# Patient Record
Sex: MALE | Race: WHITE | NOT HISPANIC OR LATINO | Employment: FULL TIME | URBAN - METROPOLITAN AREA
[De-identification: names, ages, dates, MRNs, and addresses within clinical notes are randomized per-mention and may not be internally consistent; named-entity substitution may affect disease eponyms.]

---

## 2022-03-23 ENCOUNTER — TRANSCRIBE ORDERS (OUTPATIENT)
Dept: DIABETES SERVICES | Facility: HOSPITAL | Age: 37
End: 2022-03-23

## 2022-03-23 DIAGNOSIS — E11.9 TYPE 2 DIABETES MELLITUS WITHOUT COMPLICATION, UNSPECIFIED WHETHER LONG TERM INSULIN USE: Primary | ICD-10-CM

## 2022-04-07 ENCOUNTER — TRANSCRIBE ORDERS (OUTPATIENT)
Dept: DIABETES SERVICES | Facility: HOSPITAL | Age: 37
End: 2022-04-07

## 2022-04-07 DIAGNOSIS — E11.9 TYPE 2 DIABETES MELLITUS WITHOUT COMPLICATION, UNSPECIFIED WHETHER LONG TERM INSULIN USE: Primary | ICD-10-CM

## 2022-05-10 ENCOUNTER — HOSPITAL ENCOUNTER (OUTPATIENT)
Dept: DIABETES SERVICES | Facility: HOSPITAL | Age: 37
Setting detail: RECURRING SERIES
Discharge: HOME | End: 2022-05-10

## 2022-05-10 PROCEDURE — G0108 DIAB MANAGE TRN  PER INDIV: HCPCS

## 2022-05-10 NOTE — CONSULTS
[facility]    Patient Name:  Montrell Raymundo  YOB: 1985  MRN: [id number]  Admit Date:  5/10/2022    DIABETES EDUCATION CONSULT, 90 MINUTES. This medical referred consult was provided as an in-office visit. Consent for treatment was given verbally.Patient attended their comprehensive diabetes class with RN and RD.  Please see media tab for assessment and notes if you use [facility]. If you are not an [facility] user a copy of patient's assessment and notes will be sent per routine. Thank you for this referral.        Electronically signed by:  Lisha Sierra RD  05/10/22 16:41 EDT

## 2022-05-31 ENCOUNTER — LAB (OUTPATIENT)
Dept: LAB | Facility: HOSPITAL | Age: 37
End: 2022-05-31

## 2022-05-31 ENCOUNTER — OFFICE VISIT (OUTPATIENT)
Dept: ENDOCRINOLOGY | Facility: CLINIC | Age: 37
End: 2022-05-31

## 2022-05-31 VITALS
SYSTOLIC BLOOD PRESSURE: 112 MMHG | OXYGEN SATURATION: 97 % | HEIGHT: 72 IN | HEART RATE: 54 BPM | WEIGHT: 179 LBS | BODY MASS INDEX: 24.24 KG/M2 | DIASTOLIC BLOOD PRESSURE: 60 MMHG

## 2022-05-31 DIAGNOSIS — I10 BENIGN HYPERTENSION: ICD-10-CM

## 2022-05-31 DIAGNOSIS — E11.65 TYPE 2 DIABETES MELLITUS WITH HYPERGLYCEMIA, WITHOUT LONG-TERM CURRENT USE OF INSULIN: Primary | ICD-10-CM

## 2022-05-31 DIAGNOSIS — E11.65 TYPE 2 DIABETES MELLITUS WITH HYPERGLYCEMIA, WITHOUT LONG-TERM CURRENT USE OF INSULIN: ICD-10-CM

## 2022-05-31 LAB
ALBUMIN SERPL-MCNC: 4.8 G/DL (ref 3.5–5.2)
ALBUMIN UR-MCNC: <1.2 MG/DL
ALBUMIN/GLOB SERPL: 2.2 G/DL
ALP SERPL-CCNC: 74 U/L (ref 39–117)
ALT SERPL W P-5'-P-CCNC: 19 U/L (ref 1–41)
ANION GAP SERPL CALCULATED.3IONS-SCNC: 13.5 MMOL/L (ref 5–15)
AST SERPL-CCNC: 15 U/L (ref 1–40)
BILIRUB SERPL-MCNC: 0.3 MG/DL (ref 0–1.2)
BUN SERPL-MCNC: 14 MG/DL (ref 6–20)
BUN/CREAT SERPL: 15.9 (ref 7–25)
CALCIUM SPEC-SCNC: 9.6 MG/DL (ref 8.6–10.5)
CHLORIDE SERPL-SCNC: 104 MMOL/L (ref 98–107)
CO2 SERPL-SCNC: 22.5 MMOL/L (ref 22–29)
CREAT SERPL-MCNC: 0.88 MG/DL (ref 0.76–1.27)
CREAT UR-MCNC: 143.8 MG/DL
EGFRCR SERPLBLD CKD-EPI 2021: 115.7 ML/MIN/1.73
EXPIRATION DATE: ABNORMAL
EXPIRATION DATE: NORMAL
GLOBULIN UR ELPH-MCNC: 2.2 GM/DL
GLUCOSE BLDC GLUCOMTR-MCNC: 168 MG/DL (ref 70–130)
GLUCOSE SERPL-MCNC: 87 MG/DL (ref 65–99)
HBA1C MFR BLD: 5.5 %
Lab: ABNORMAL
Lab: NORMAL
MICROALBUMIN/CREAT UR: NORMAL MG/G{CREAT}
POTASSIUM SERPL-SCNC: 4 MMOL/L (ref 3.5–5.2)
PROT SERPL-MCNC: 7 G/DL (ref 6–8.5)
SODIUM SERPL-SCNC: 140 MMOL/L (ref 136–145)
TSH SERPL DL<=0.05 MIU/L-ACNC: 0.93 UIU/ML (ref 0.27–4.2)

## 2022-05-31 PROCEDURE — 99204 OFFICE O/P NEW MOD 45 MIN: CPT | Performed by: INTERNAL MEDICINE

## 2022-05-31 PROCEDURE — 82947 ASSAY GLUCOSE BLOOD QUANT: CPT | Performed by: INTERNAL MEDICINE

## 2022-05-31 PROCEDURE — 80053 COMPREHEN METABOLIC PANEL: CPT

## 2022-05-31 PROCEDURE — 86341 ISLET CELL ANTIBODY: CPT

## 2022-05-31 PROCEDURE — 82570 ASSAY OF URINE CREATININE: CPT

## 2022-05-31 PROCEDURE — 84443 ASSAY THYROID STIM HORMONE: CPT

## 2022-05-31 PROCEDURE — 36415 COLL VENOUS BLD VENIPUNCTURE: CPT

## 2022-05-31 PROCEDURE — 82043 UR ALBUMIN QUANTITATIVE: CPT

## 2022-05-31 PROCEDURE — 83036 HEMOGLOBIN GLYCOSYLATED A1C: CPT | Performed by: INTERNAL MEDICINE

## 2022-05-31 RX ORDER — LOSARTAN POTASSIUM 25 MG/1
25 TABLET ORAL DAILY
COMMUNITY
Start: 2022-05-28

## 2022-05-31 RX ORDER — BLOOD SUGAR DIAGNOSTIC
1 STRIP MISCELLANEOUS 2 TIMES DAILY
Qty: 200 EACH | Refills: 3 | Status: SHIPPED | OUTPATIENT
Start: 2022-05-31 | End: 2022-10-17 | Stop reason: SDUPTHER

## 2022-05-31 RX ORDER — LANCETS 33 GAUGE
EACH MISCELLANEOUS 2 TIMES DAILY
COMMUNITY
Start: 2022-05-06 | End: 2022-10-17 | Stop reason: SDUPTHER

## 2022-05-31 RX ORDER — BLOOD SUGAR DIAGNOSTIC
STRIP MISCELLANEOUS 2 TIMES DAILY
COMMUNITY
Start: 2022-05-06 | End: 2022-05-31 | Stop reason: SDUPTHER

## 2022-05-31 RX ORDER — ROSUVASTATIN CALCIUM 20 MG/1
TABLET, COATED ORAL
COMMUNITY
Start: 2022-05-28 | End: 2023-02-03 | Stop reason: SDUPTHER

## 2022-05-31 RX ORDER — ATORVASTATIN CALCIUM 10 MG/1
10 TABLET, FILM COATED ORAL NIGHTLY
COMMUNITY
Start: 2022-03-03 | End: 2022-05-31 | Stop reason: ALTCHOICE

## 2022-05-31 RX ORDER — BLOOD-GLUCOSE METER
KIT MISCELLANEOUS SEE ADMIN INSTRUCTIONS
COMMUNITY
Start: 2022-03-04

## 2022-05-31 NOTE — PROGRESS NOTES
"     Office Note      Date: 2022  Patient Name: Montrell Raymundo  MRN: [id number]  : 1985    Chief Complaint   Patient presents with   • Diabetes     Type I       History of Present Illness:   Montrell Raymundo is a 36 y.o. male who presents for Diabetes type 2. Diagnosed in: . Treated in past with oral agents. Current treatments: metformin. Number of insulin shots per day: none. Checks blood sugar 2 times a day. Has low blood sugar: no. Aspirin use: No -  . Statin use: Yes. ACE-I/ARB use: Yes.  Last eye exam: prior to diagnosis.    He had COVID 2022.  His BP was high and he saw a cardiologist.  Labs were done and he was diagnosed with DM.  He had DM education earlier this month.  He has noted some depression with the diagnosis.  He notes increased appetite.  He hasn't been eating much.  He has lost about 25 pounds - some of this was intentional.      Subjective      Diabetic Complications:  Eyes: No  Kidneys: No  Feet: No  Heart: No    Diet and Exercise:  Meals per day: 3  Minutes of exercise per week: 210 mins.    Review of Systems:   Review of Systems   Constitutional: Positive for activity change.   HENT: Negative.    Eyes: Negative.    Respiratory: Negative.    Cardiovascular: Negative.    Gastrointestinal: Positive for constipation.   Endocrine: Negative.    Genitourinary: Negative.    Musculoskeletal: Negative.    Skin: Negative.    Allergic/Immunologic: Negative.    Neurological: Negative.    Hematological: Negative.    Psychiatric/Behavioral: Negative.        The following portions of the patient's history were reviewed and updated as appropriate: allergies, current medications, past family history, past medical history, past social history, past surgical history and problem list.    Objective     Visit Vitals  /60 (BP Location: Left arm, Patient Position: Sitting, Cuff Size: Adult)   Pulse 54   Ht 182.9 cm (72\")   Wt 81.2 kg (179 lb)   SpO2 97%   BMI 24.28 kg/m²       Physical " Exam:  Physical Exam  Constitutional:       Appearance: Normal appearance.   HENT:      Head: Normocephalic and atraumatic.   Eyes:      Extraocular Movements: Extraocular movements intact.      Conjunctiva/sclera: Conjunctivae normal.      Pupils: Pupils are equal, round, and reactive to light.   Neck:      Thyroid: No thyroid mass, thyromegaly or thyroid tenderness.   Cardiovascular:      Rate and Rhythm: Normal rate and regular rhythm.      Pulses: Normal pulses.           Dorsalis pedis pulses are 2+ on the right side and 2+ on the left side.        Posterior tibial pulses are 2+ on the right side and 2+ on the left side.      Heart sounds: Normal heart sounds.   Pulmonary:      Effort: Pulmonary effort is normal.      Breath sounds: Normal breath sounds.   Abdominal:      General: Bowel sounds are normal.      Palpations: Abdomen is soft.   Musculoskeletal:         General: Normal range of motion.      Cervical back: Normal range of motion.   Feet:      Right foot:      Protective Sensation: 5 sites tested. 5 sites sensed.      Skin integrity: Skin integrity normal.      Toenail Condition: Right toenails are normal.      Left foot:      Protective Sensation: 5 sites tested. 5 sites sensed.      Skin integrity: Skin integrity normal.      Toenail Condition: Left toenails are normal.   Lymphadenopathy:      Cervical: No cervical adenopathy.   Skin:     General: Skin is warm and dry.   Neurological:      General: No focal deficit present.      Mental Status: He is alert.   Psychiatric:         Mood and Affect: Mood normal.         Behavior: Behavior normal.         Thought Content: Thought content normal.         Judgment: Judgment normal.         Labs:    HbA1c  Hemoglobin A1C   Date Value Ref Range Status   05/31/2022 5.5 % Final   .    CMP  No results found for: GLUCOSE, BUN, CREATININE, EGFRIFNONA, EGFRIFAFRI, BCR, K, CO2, CALCIUM, PROTENTOTREF, LABIL2, BILIRUBIN, AST, ALT     Lipid Panel        TSH  No  results found for: TSH, FREET4     Hemoglobin A1C  Lab Results   Component Value Date    HGBA1C 5.5 05/31/2022        Microalbumin/Creatinine  No results found for: MALBCRERATI        Assessment / Plan      Assessment & Plan:  Diagnoses and all orders for this visit:    1. Type 2 diabetes mellitus with hyperglycemia, without long-term current use of insulin (HCC) (Primary)  Assessment & Plan:  Diabetes is improving with treatment.  Recent FSBS have been good.  A1c normal at 5.5%.  We discussed whether he still needs metformin.  Diabetes will be reassessed in 3 months.    He developed DM after COVID-19.  Recent FSBS have been okay.  Not sure if he still has DM.  We discussed stopping metformin.  Also would like to do labs to rule out type I DM (in honeymoon phase).      He has struggled with some depression with the DM diagnosis.  He feels this should improve over time.  We discussed counseling or seeing his PCP about meds.  He declined.    Orders:  -     POC Glucose, Blood  -     POC Glycosylated Hemoglobin (Hb A1C)  -     Microalbumin / Creatinine Urine Ratio - Urine, Clean Catch; Future  -     Comprehensive Metabolic Panel; Future  -     TSH; Future  -     Glutamic Acid Decarboxylase; Future    2. Benign hypertension  Assessment & Plan:  Hypertension is improving with treatment.  Continue current treatment regimen.  Blood pressure will be reassessed at the next regular appointment.      Other orders  -     OneTouch Verio test strip; 1 each by Other route 2 (Two) Times a Day. ICD-10 E11.65  Dispense: 200 each; Refill: 3       Return in about 3 months (around 8/31/2022) for Recheck with A1c.    Tommy Emanuel MD   05/31/2022

## 2022-05-31 NOTE — ASSESSMENT & PLAN NOTE
Diabetes is improving with treatment.  Recent FSBS have been good.  A1c normal at 5.5%.  We discussed whether he still needs metformin.  Diabetes will be reassessed in 3 months.    He developed DM after COVID-19.  Recent FSBS have been okay.  Not sure if he still has DM.  We discussed stopping metformin.  Also would like to do labs to rule out type I DM (in honeymoon phase).      He has struggled with some depression with the DM diagnosis.  He feels this should improve over time.  We discussed counseling or seeing his PCP about meds.  He declined.

## 2022-06-01 LAB — HBA1C MFR BLD: 10.5 %

## 2022-06-02 ENCOUNTER — TELEPHONE (OUTPATIENT)
Dept: ENDOCRINOLOGY | Facility: CLINIC | Age: 37
End: 2022-06-02

## 2022-06-02 ENCOUNTER — PATIENT ROUNDING (BHMG ONLY) (OUTPATIENT)
Dept: ENDOCRINOLOGY | Facility: CLINIC | Age: 37
End: 2022-06-02

## 2022-06-02 LAB — GAD65 AB SER IA-ACNC: <5 U/ML (ref 0–5)

## 2022-06-02 NOTE — TELEPHONE ENCOUNTER
Spoke with patient.  Reviewed lab letter with him from 06/01/2022.  He wanted to know if he was type 1 or type 2 diabetic.  Advised there was still one result pending.  Wanted to know what he would look for in that result to distinguish between the two.

## 2022-06-02 NOTE — PROGRESS NOTES
A [employer] message has been sent to the patient for patient rounding with [facility]

## 2022-06-09 ENCOUNTER — HOSPITAL ENCOUNTER (OUTPATIENT)
Dept: DIABETES SERVICES | Facility: HOSPITAL | Age: 37
Setting detail: RECURRING SERIES
Discharge: HOME | End: 2022-06-09

## 2022-10-17 ENCOUNTER — OFFICE VISIT (OUTPATIENT)
Dept: ENDOCRINOLOGY | Facility: CLINIC | Age: 37
End: 2022-10-17

## 2022-10-17 VITALS
BODY MASS INDEX: 22.75 KG/M2 | SYSTOLIC BLOOD PRESSURE: 102 MMHG | OXYGEN SATURATION: 98 % | WEIGHT: 168 LBS | DIASTOLIC BLOOD PRESSURE: 62 MMHG | HEIGHT: 72 IN | HEART RATE: 74 BPM

## 2022-10-17 DIAGNOSIS — I10 BENIGN HYPERTENSION: ICD-10-CM

## 2022-10-17 DIAGNOSIS — E11.65 TYPE 2 DIABETES MELLITUS WITH HYPERGLYCEMIA, WITHOUT LONG-TERM CURRENT USE OF INSULIN: Primary | ICD-10-CM

## 2022-10-17 LAB
EXPIRATION DATE: NORMAL
EXPIRATION DATE: NORMAL
GLUCOSE BLDC GLUCOMTR-MCNC: 71 MG/DL (ref 70–130)
HBA1C MFR BLD: 5.4 %
Lab: NORMAL
Lab: NORMAL

## 2022-10-17 PROCEDURE — 99213 OFFICE O/P EST LOW 20 MIN: CPT | Performed by: INTERNAL MEDICINE

## 2022-10-17 PROCEDURE — 83036 HEMOGLOBIN GLYCOSYLATED A1C: CPT | Performed by: INTERNAL MEDICINE

## 2022-10-17 PROCEDURE — 82947 ASSAY GLUCOSE BLOOD QUANT: CPT | Performed by: INTERNAL MEDICINE

## 2022-10-17 RX ORDER — LANCETS 33 GAUGE
1 EACH MISCELLANEOUS 2 TIMES DAILY
Qty: 200 EACH | Refills: 3 | Status: SHIPPED | OUTPATIENT
Start: 2022-10-17

## 2022-10-17 RX ORDER — BLOOD SUGAR DIAGNOSTIC
1 STRIP MISCELLANEOUS 2 TIMES DAILY
Qty: 200 EACH | Refills: 3 | Status: SHIPPED | OUTPATIENT
Start: 2022-10-17

## 2022-10-17 NOTE — ASSESSMENT & PLAN NOTE
Diabetes is improving with lifestyle modifications.   He doesn't appear to have DM anymore.  Diabetes will be reassessed in 6 months.

## 2022-10-17 NOTE — PROGRESS NOTES
"     Office Note      Date: 10/17/2022  Patient Name: Montrell Raymundo  MRN: [id number]  : 1985    Chief Complaint   Patient presents with   • Diabetes       History of Present Illness:   Montrell Raymundo is a 36 y.o. male who presents for Diabetes type 2. Diagnosed in: . Treated in past with oral agents. Current treatments: diet. Number of insulin shots per day: none. Checks blood sugar 1 time a day. Has low blood sugar: no. Aspirin use: No -  . Statin use: Yes. ACE-I/ARB use: Yes.  Change in health since last visit: none.  Last eye exam: 2022.    Subjective      Diabetic Complications:  Eyes: No  Kidneys: No  Feet: No  Heart: No    Diet and Exercise:  Meals per day: 3  Minutes of exercise per week: 210 mins.    Review of Systems:   Review of Systems   Constitutional: Negative.    Cardiovascular: Negative.    Gastrointestinal: Negative.    Endocrine: Negative.        The following portions of the patient's history were reviewed and updated as appropriate: allergies, current medications, past family history, past medical history, past social history, past surgical history and problem list.    Objective       Visit Vitals  /62   Pulse 74   Ht 182.9 cm (72\")   Wt 76.2 kg (168 lb)   SpO2 98%   BMI 22.78 kg/m²       Physical Exam:  Physical Exam  Constitutional:       Appearance: Normal appearance.   Neurological:      Mental Status: He is alert.         Labs:    HbA1c  Lab Results   Component Value Date    HGBA1C 5.4 10/17/2022       CMP  Lab Results   Component Value Date    GLUCOSE 87 2022    BUN 14 2022    CREATININE 0.88 2022    BCR 15.9 2022    K 4.0 2022    CO2 22.5 2022    CALCIUM 9.6 2022    AST 15 2022    ALT 19 2022        Lipid Panel        TSH  Lab Results   Component Value Date    TSH 0.930 2022        Hemoglobin A1C  Lab Results   Component Value Date    HGBA1C 5.4 10/17/2022        Microalbumin/Creatinine  Lab Results   Component " Value Date    FACUNDO  05/31/2022      Comment:      Unable to calculate    MICROALBUR <1.2 05/31/2022           Assessment / Plan      Assessment & Plan:  Diagnoses and all orders for this visit:    1. Type 2 diabetes mellitus with hyperglycemia, without long-term current use of insulin (HCC) (Primary)  Assessment & Plan:  Diabetes is improving with lifestyle modifications.   He doesn't appear to have DM anymore.  Diabetes will be reassessed in 6 months.    Orders:  -     POC Glucose, Blood  -     POC Glycosylated Hemoglobin (Hb A1C)    2. Benign hypertension  Assessment & Plan:  Hypertension is unchanged.  Continue current treatment regimen.  Blood pressure will be reassessed at the next regular appointment.      Other orders  -     OneTouch Verio test strip; 1 each by Other route 2 (Two) Times a Day. ICD-10 E11.65  Dispense: 200 each; Refill: 3  -     Lancets (OneTouch Delica Plus [id number]) misc; 1 each 2 (Two) Times a Day. ICD-10 E11.9  Dispense: 200 each; Refill: 3      Return in about 6 months (around 4/17/2023) for Recheck with A1c.    Tommy Emanuel MD   10/17/2022

## 2023-01-23 ENCOUNTER — EDUCATION (OUTPATIENT)
Dept: DIABETES SERVICES | Facility: HOSPITAL | Age: 38
End: 2023-01-23
Payer: COMMERCIAL

## 2023-01-23 NOTE — CONSULTS
Diabetes Education    Patient Name:  Montrell Raymundo  YOB: 1985  MRN: [id number]  Admit Date:  (Not on file)      Patient participated in 15 min 6 mos diabetes education follow-up via telephone today. Please see media tab for assessment and notes if you use [facility]. If you are not an [employer] user a copy of patient's assessment and notes will be sent per routine. Thank you.       Electronically signed by:  Nena Blake RN  01/23/23 10:52 EST

## 2023-02-06 RX ORDER — ROSUVASTATIN CALCIUM 20 MG/1
20 TABLET, COATED ORAL NIGHTLY
Qty: 90 TABLET | Refills: 3 | Status: SHIPPED | OUTPATIENT
Start: 2023-02-06

## 2023-04-04 ENCOUNTER — OFFICE VISIT (OUTPATIENT)
Dept: ENDOCRINOLOGY | Facility: CLINIC | Age: 38
End: 2023-04-04
Payer: COMMERCIAL

## 2023-04-04 VITALS
HEART RATE: 64 BPM | SYSTOLIC BLOOD PRESSURE: 118 MMHG | HEIGHT: 72 IN | OXYGEN SATURATION: 98 % | WEIGHT: 173 LBS | BODY MASS INDEX: 23.43 KG/M2 | DIASTOLIC BLOOD PRESSURE: 80 MMHG

## 2023-04-04 DIAGNOSIS — E04.1 THYROID NODULE: ICD-10-CM

## 2023-04-04 DIAGNOSIS — I10 BENIGN HYPERTENSION: ICD-10-CM

## 2023-04-04 DIAGNOSIS — R73.03 PREDIABETES: Primary | ICD-10-CM

## 2023-04-04 LAB
25(OH)D3 SERPL-MCNC: 29.9 NG/ML (ref 30–100)
ALBUMIN SERPL-MCNC: 4.9 G/DL (ref 3.5–5.2)
ALBUMIN UR-MCNC: <1.2 MG/DL
ALBUMIN/GLOB SERPL: 1.9 G/DL
ALP SERPL-CCNC: 71 U/L (ref 39–117)
ALT SERPL W P-5'-P-CCNC: 27 U/L (ref 1–41)
ANION GAP SERPL CALCULATED.3IONS-SCNC: 10 MMOL/L (ref 5–15)
AST SERPL-CCNC: 9 U/L (ref 1–40)
BILIRUB SERPL-MCNC: 0.3 MG/DL (ref 0–1.2)
BUN SERPL-MCNC: 21 MG/DL (ref 6–20)
BUN/CREAT SERPL: 24.4 (ref 7–25)
CALCIUM SPEC-SCNC: 10.1 MG/DL (ref 8.6–10.5)
CHLORIDE SERPL-SCNC: 101 MMOL/L (ref 98–107)
CHOLEST SERPL-MCNC: 105 MG/DL (ref 0–200)
CO2 SERPL-SCNC: 27 MMOL/L (ref 22–29)
CREAT SERPL-MCNC: 0.86 MG/DL (ref 0.76–1.27)
CREAT UR-MCNC: 112.1 MG/DL
DEPRECATED RDW RBC AUTO: 38.6 FL (ref 37–54)
EGFRCR SERPLBLD CKD-EPI 2021: 115.8 ML/MIN/1.73
ERYTHROCYTE [DISTWIDTH] IN BLOOD BY AUTOMATED COUNT: 12.1 % (ref 12.3–15.4)
EXPIRATION DATE: NORMAL
EXPIRATION DATE: NORMAL
GLOBULIN UR ELPH-MCNC: 2.6 GM/DL
GLUCOSE BLDC GLUCOMTR-MCNC: 108 MG/DL (ref 70–130)
GLUCOSE SERPL-MCNC: 104 MG/DL (ref 65–99)
HBA1C MFR BLD: 5.5 %
HCT VFR BLD AUTO: 41.8 % (ref 37.5–51)
HDLC SERPL-MCNC: 61 MG/DL (ref 40–60)
HGB BLD-MCNC: 14.2 G/DL (ref 13–17.7)
LDLC SERPL CALC-MCNC: 31 MG/DL (ref 0–100)
LDLC/HDLC SERPL: 0.53 {RATIO}
Lab: NORMAL
Lab: NORMAL
MCH RBC QN AUTO: 29.8 PG (ref 26.6–33)
MCHC RBC AUTO-ENTMCNC: 34 G/DL (ref 31.5–35.7)
MCV RBC AUTO: 87.8 FL (ref 79–97)
MICROALBUMIN/CREAT UR: NORMAL MG/G{CREAT}
PLATELET # BLD AUTO: 202 10*3/MM3 (ref 140–450)
PMV BLD AUTO: 11.1 FL (ref 6–12)
POTASSIUM SERPL-SCNC: 4.2 MMOL/L (ref 3.5–5.2)
PROT SERPL-MCNC: 7.5 G/DL (ref 6–8.5)
RBC # BLD AUTO: 4.76 10*6/MM3 (ref 4.14–5.8)
SODIUM SERPL-SCNC: 138 MMOL/L (ref 136–145)
TRIGL SERPL-MCNC: 58 MG/DL (ref 0–150)
TSH SERPL DL<=0.05 MIU/L-ACNC: 1.36 UIU/ML (ref 0.27–4.2)
VLDLC SERPL-MCNC: 13 MG/DL (ref 5–40)
WBC NRBC COR # BLD: 5.94 10*3/MM3 (ref 3.4–10.8)

## 2023-04-04 PROCEDURE — 85027 COMPLETE CBC AUTOMATED: CPT | Performed by: INTERNAL MEDICINE

## 2023-04-04 PROCEDURE — 80061 LIPID PANEL: CPT | Performed by: INTERNAL MEDICINE

## 2023-04-04 PROCEDURE — 82570 ASSAY OF URINE CREATININE: CPT | Performed by: INTERNAL MEDICINE

## 2023-04-04 PROCEDURE — 82306 VITAMIN D 25 HYDROXY: CPT | Performed by: INTERNAL MEDICINE

## 2023-04-04 PROCEDURE — 76536 US EXAM OF HEAD AND NECK: CPT | Performed by: INTERNAL MEDICINE

## 2023-04-04 PROCEDURE — 82947 ASSAY GLUCOSE BLOOD QUANT: CPT | Performed by: INTERNAL MEDICINE

## 2023-04-04 PROCEDURE — 82043 UR ALBUMIN QUANTITATIVE: CPT | Performed by: INTERNAL MEDICINE

## 2023-04-04 PROCEDURE — 99214 OFFICE O/P EST MOD 30 MIN: CPT | Performed by: INTERNAL MEDICINE

## 2023-04-04 PROCEDURE — 80053 COMPREHEN METABOLIC PANEL: CPT | Performed by: INTERNAL MEDICINE

## 2023-04-04 PROCEDURE — 84443 ASSAY THYROID STIM HORMONE: CPT | Performed by: INTERNAL MEDICINE

## 2023-04-04 PROCEDURE — 83036 HEMOGLOBIN GLYCOSYLATED A1C: CPT | Performed by: INTERNAL MEDICINE

## 2023-04-04 PROCEDURE — 36415 COLL VENOUS BLD VENIPUNCTURE: CPT | Performed by: INTERNAL MEDICINE

## 2023-04-04 NOTE — ASSESSMENT & PLAN NOTE
Diabetes is unchanged.  I would argue that he no longer classifies as having type II DM but recent FSBS have been in the prediabetes range.  Continue current treatment regimen.  Diabetes will be reassessed in 3 months.

## 2023-04-04 NOTE — ASSESSMENT & PLAN NOTE
He was thought to have thyroid nodule on routine neck exam today.  Check TSH.    A neck u/s was performed today.  This revealed several adjacent solid nodules inferiorly in the right thyroid lobe.  These were 1.4cm or less in size.  There was a tiny cystic/solid nodule in the left lobe also.  No suspicious characteristics were noted.  No increased blood flow or calcifications.  No abnormal lymph nodes were seen.    Plan for another u/s in a year.

## 2023-04-04 NOTE — PROGRESS NOTES
"     Office Note      Date: 2023  Patient Name: Montrell Raymundo  MRN: [id number]  : 1985    Chief Complaint   Patient presents with   • Diabetes       History of Present Illness:   Montrell Raymundo is a 37 y.o. male who presents for Diabetes type 2. Diagnosed in: . Treated in past with oral agents. Current treatments: diet. Number of insulin shots per day: none. Checks blood sugar 1 time a day. Has low blood sugar: no. Aspirin use: No -  . Statin use: Yes. ACE-I/ARB use: Yes.  Change in health since last visit: none.  Last eye exam: 2022.    Subjective      Diabetic Complications:  Eyes: No  Kidneys: No  Feet: No  Heart: No    Diet and Exercise:  Meals per day: 3  Minutes of exercise per week: 210 mins.    Review of Systems:   Review of Systems   Constitutional: Negative.    Cardiovascular: Negative.    Gastrointestinal: Negative.    Endocrine: Negative.        The following portions of the patient's history were reviewed and updated as appropriate: allergies, current medications, past family history, past medical history, past social history, past surgical history and problem list.    Objective       Visit Vitals  /80   Pulse 64   Ht 182.9 cm (72\")   Wt 78.5 kg (173 lb)   SpO2 98%   BMI 23.46 kg/m²       Physical Exam:  Physical Exam  Constitutional:       Appearance: Normal appearance.   Neck:      Thyroid: Thyroid mass present. No thyroid tenderness.      Comments: 1.5cm right thyroid nodule laterally - freely movable  Cardiovascular:      Pulses:           Dorsalis pedis pulses are 2+ on the right side and 2+ on the left side.        Posterior tibial pulses are 2+ on the right side and 2+ on the left side.   Feet:      Right foot:      Protective Sensation: 5 sites tested. 5 sites sensed.      Skin integrity: Skin integrity normal.      Toenail Condition: Right toenails are normal.      Left foot:      Protective Sensation: 5 sites tested. 5 sites sensed.      Skin integrity: Skin integrity " normal.      Toenail Condition: Left toenails are normal.   Lymphadenopathy:      Cervical: No cervical adenopathy.   Neurological:      Mental Status: He is alert.         Labs:    HbA1c  Lab Results   Component Value Date    HGBA1C 5.5 04/04/2023       CMP  Lab Results   Component Value Date    GLUCOSE 87 05/31/2022    BUN 14 05/31/2022    CREATININE 0.88 05/31/2022    BCR 15.9 05/31/2022    K 4.0 05/31/2022    CO2 22.5 05/31/2022    CALCIUM 9.6 05/31/2022    AST 15 05/31/2022    ALT 19 05/31/2022        Lipid Panel        TSH  Lab Results   Component Value Date    TSH 0.930 05/31/2022        Hemoglobin A1C  Lab Results   Component Value Date    HGBA1C 5.5 04/04/2023        Microalbumin/Creatinine  Lab Results   Component Value Date    MALBCRERATIO  05/31/2022      Comment:      Unable to calculate    MICROALBUR <1.2 05/31/2022           Assessment / Plan      Assessment & Plan:  Diagnoses and all orders for this visit:    1. Prediabetes (Primary)  Assessment & Plan:  Diabetes is unchanged.  I would argue that he no longer classifies as having type II DM but recent FSBS have been in the prediabetes range.  Continue current treatment regimen.  Diabetes will be reassessed in 3 months.    Orders:  -     POC Glucose, Blood  -     POC Glycosylated Hemoglobin (Hb A1C)  -     Comprehensive Metabolic Panel; Future  -     CBC (No Diff); Future  -     Lipid Panel; Future  -     Microalbumin / Creatinine Urine Ratio - Urine, Clean Catch; Future  -     TSH; Future  -     Vitamin D,25-Hydroxy; Future    2. Benign hypertension  Assessment & Plan:  Hypertension is unchanged.  Continue current treatment regimen.  Blood pressure will be reassessed at the next regular appointment.      3. Thyroid nodule  Assessment & Plan:  He was thought to have thyroid nodule on routine neck exam today.  Check TSH.    A neck u/s was performed today.  This revealed several adjacent solid nodules inferiorly in the right thyroid lobe.  These were  1.4cm or less in size.  There was a tiny cystic/solid nodule in the left lobe also.  No suspicious characteristics were noted.  No increased blood flow or calcifications.  No abnormal lymph nodes were seen.    Plan for another u/s in a year.    Orders:  -     US Thyroid    Current Outpatient Medications   Medication Instructions   • Blood Glucose Monitoring Suppl (OneTouch Verio Reflect) w/Device kit See Admin Instructions   • Lancets (OneTouch Delica Plus [id number]) misc 1 each, Does not apply, 2 Times Daily, ICD-10 E11.9   • losartan (COZAAR) 25 mg, Oral, Daily   • OneTouch Verio test strip 1 each, Other, 2 Times Daily, ICD-10 E11.65   • rosuvastatin (CRESTOR) 20 mg, Oral, Nightly      Return in about 6 months (around 10/4/2023) for Recheck with A1c, TSH.    Tommy Emanuel MD   04/04/2023

## 2023-10-04 ENCOUNTER — OFFICE VISIT (OUTPATIENT)
Dept: ENDOCRINOLOGY | Facility: CLINIC | Age: 38
End: 2023-10-04
Payer: COMMERCIAL

## 2023-10-04 VITALS
BODY MASS INDEX: 25.06 KG/M2 | SYSTOLIC BLOOD PRESSURE: 112 MMHG | WEIGHT: 185 LBS | HEART RATE: 64 BPM | HEIGHT: 72 IN | DIASTOLIC BLOOD PRESSURE: 68 MMHG

## 2023-10-04 DIAGNOSIS — E04.1 THYROID NODULE: ICD-10-CM

## 2023-10-04 DIAGNOSIS — R73.03 PREDIABETES: Primary | ICD-10-CM

## 2023-10-04 DIAGNOSIS — I10 BENIGN HYPERTENSION: ICD-10-CM

## 2023-10-04 LAB
EXPIRATION DATE: NORMAL
EXPIRATION DATE: NORMAL
GLUCOSE BLDC GLUCOMTR-MCNC: 115 MG/DL (ref 70–130)
HBA1C MFR BLD: 5.3 %
Lab: NORMAL
Lab: NORMAL
TSH SERPL DL<=0.05 MIU/L-ACNC: 1.89 UIU/ML (ref 0.27–4.2)

## 2023-10-04 PROCEDURE — 84443 ASSAY THYROID STIM HORMONE: CPT | Performed by: INTERNAL MEDICINE

## 2023-10-04 NOTE — PROGRESS NOTES
"     Office Note      Date: 10/04/2023  Patient Name: Montrell Raymundo  MRN: [id number]  : 1985    Chief Complaint   Patient presents with    Blood Sugar Problem     Prediabetes       History of Present Illness:   Montrell Raymundo is a 37 y.o. male who presents for Prediabetes. Diagnosed in: . Treated in past with oral agents. Current treatments: diet. Number of insulin shots per day: none. Checks blood sugar 1 time a week. Has low blood sugar: no. Aspirin use: No. Statin use: Yes. ACE-I/ARB use: Yes.  Change in health since last visit: none.  Last eye exam: 2022.     Subjective      Diabetic Complications:  Eyes: No  Kidneys: No  Feet: No  Heart: No    Diet and Exercise:  Meals per day: 3  Minutes of exercise per week: 210 mins.    Review of Systems:   Review of Systems   Constitutional: Negative.    Cardiovascular: Negative.    Gastrointestinal: Negative.    Endocrine: Negative.      The following portions of the patient's history were reviewed and updated as appropriate: allergies, current medications, past family history, past medical history, past social history, past surgical history, and problem list.    Objective     Visit Vitals  /68   Pulse 64   Ht 182.9 cm (72.01\")   Wt 83.9 kg (185 lb)   BMI 25.08 kg/m²       Physical Exam:  Physical Exam  Constitutional:       Appearance: Normal appearance.   Neck:      Thyroid: Thyroid mass present. No thyromegaly or thyroid tenderness.      Comments: 1.5cm nodule laterally in right thyroid lobe - freely movable  Lymphadenopathy:      Cervical: No cervical adenopathy.   Neurological:      Mental Status: He is alert.       Labs:    HbA1c  Lab Results   Component Value Date    HGBA1C 5.3 10/04/2023       CMP  Lab Results   Component Value Date    GLUCOSE 104 (H) 2023    BUN 21 (H) 2023    CREATININE 0.86 2023    BCR 24.4 2023    K 4.2 2023    CO2 27.0 2023    CALCIUM 10.1 2023    AST 9 2023    ALT 27 2023 "        Lipid Panel  Lab Results   Component Value Date    HDL 61 (H) 04/04/2023    LDL 31 04/04/2023    TRIG 58 04/04/2023        TSH  Lab Results   Component Value Date    TSH 1.360 04/04/2023        Hemoglobin A1C  Lab Results   Component Value Date    HGBA1C 5.3 10/04/2023        Microalbumin/Creatinine  Lab Results   Component Value Date    MALBCRERATIO  04/04/2023      Comment:      Unable to calculate    MICROALBUR <1.2 04/04/2023           Assessment / Plan      Assessment & Plan:  Diagnoses and all orders for this visit:    1. Prediabetes (Primary)  Assessment & Plan:  A1c looks good.  Continue to work on diet/exercise.    Orders:  -     POC Glycosylated Hemoglobin (Hb A1C)  -     POC Glucose, Blood    2. Thyroid nodule  Assessment & Plan:  Stable to palpation.  Check TSH.  Plan for neck u/s next visit.    Orders:  -     TSH; Future    3. Benign hypertension  Assessment & Plan:  Hypertension is unchanged.  Continue current treatment regimen.  Blood pressure will be reassessed at the next regular appointment.        Current Outpatient Medications   Medication Instructions    Blood Glucose Monitoring Suppl (OneTouch Verio Reflect) w/Device kit See Admin Instructions    Lancets (OneTouch Delica Plus [id number]) misc 1 each, Does not apply, 2 Times Daily, ICD-10 E11.9    losartan (COZAAR) 25 mg, Oral, Daily    OneTouch Verio test strip 1 each, Other, 2 Times Daily, ICD-10 E11.65    rosuvastatin (CRESTOR) 20 mg, Oral, Nightly      Return in about 6 months (around 4/4/2024) for Recheck with A1c, TSH, neck u/s.    Tommy Emanuel MD   10/04/2023

## 2024-04-15 ENCOUNTER — OFFICE VISIT (OUTPATIENT)
Dept: ENDOCRINOLOGY | Facility: CLINIC | Age: 39
End: 2024-04-15
Payer: COMMERCIAL

## 2024-04-15 VITALS
SYSTOLIC BLOOD PRESSURE: 116 MMHG | DIASTOLIC BLOOD PRESSURE: 80 MMHG | HEIGHT: 72 IN | OXYGEN SATURATION: 98 % | HEART RATE: 55 BPM | WEIGHT: 193.4 LBS | BODY MASS INDEX: 26.19 KG/M2

## 2024-04-15 DIAGNOSIS — I10 BENIGN HYPERTENSION: ICD-10-CM

## 2024-04-15 DIAGNOSIS — E04.1 THYROID NODULE: ICD-10-CM

## 2024-04-15 DIAGNOSIS — R73.03 PREDIABETES: Primary | ICD-10-CM

## 2024-04-15 LAB
EXPIRATION DATE: NORMAL
EXPIRATION DATE: NORMAL
GLUCOSE BLDC GLUCOMTR-MCNC: 111 MG/DL (ref 70–130)
HBA1C MFR BLD: 5.5 % (ref 4.5–5.7)
Lab: NORMAL
Lab: NORMAL

## 2024-04-15 PROCEDURE — 82947 ASSAY GLUCOSE BLOOD QUANT: CPT | Performed by: PHYSICIAN ASSISTANT

## 2024-04-15 PROCEDURE — 76536 US EXAM OF HEAD AND NECK: CPT | Performed by: INTERNAL MEDICINE

## 2024-04-15 PROCEDURE — 99214 OFFICE O/P EST MOD 30 MIN: CPT | Performed by: PHYSICIAN ASSISTANT

## 2024-04-15 PROCEDURE — 83036 HEMOGLOBIN GLYCOSYLATED A1C: CPT | Performed by: PHYSICIAN ASSISTANT

## 2024-04-15 NOTE — PROGRESS NOTES
"     Office Note      Date: 04/15/2024  Patient Name: Montrell Raymundo  MRN: [id number]  : 1985    Chief Complaint   Patient presents with    Prediabetes       History of Present Illness:   Montrell Raymundo is a 38 y.o. male who presents for follow-up for prediabetes diagnosed in  and thyroid nodule.  He is currently on no medications for diabetes.  He reports he is surprised his hemoglobin A1c is not a little bit higher as he has gained some weight since last visit.  He reports he continues to try to eat healthy but has not been restricting himself as much as he was before.  He denies any changes in his neck today.  He is due for thyroid ultrasound today.  He had an ultrasound done 2023 which showed several adjacent thyroid nodules on the right lobe measuring 1.4 cm or less.  He is not currently on any thyroid medication and his TSH was normal in October.  He goes annually for eye exams.  He is due for fasting labs but is not fasting today.  We will plan to do these sometime in the next month for monitoring.  He remains on Crestor 20 mg daily.  He reports he is unsure why he takes this medication but thinks this was added when he was diagnosed with diabetes with an A1c of 10.2.  After weight loss and healthy eating he was able to get this under control.  He is wondering if he needs to continue this medication.      Subjective     Review of Systems:   Review of Systems   Constitutional: Negative.    Cardiovascular: Negative.    Gastrointestinal: Negative.    Endocrine: Negative.    Neurological: Negative.        The following portions of the patient's history were reviewed and updated as appropriate: allergies, current medications, past family history, past medical history, past social history, past surgical history, and problem list.    Objective     Vitals:    04/15/24 0925   BP: 116/80   Pulse: 55   SpO2: 98%   Weight: 87.7 kg (193 lb 6.4 oz)   Height: 182.9 cm (72.01\")     Body mass index is 26.22 " kg/m².    Physical Exam  Vitals reviewed.   Constitutional:       General: He is not in acute distress.     Appearance: Normal appearance.   Neurological:      Mental Status: He is alert.         HEMOGLOBIN A1C  Lab Results   Component Value Date    HGBA1C 5.5 04/15/2024       GLUCOSE  Glucose   Date Value Ref Range Status   04/15/2024 111 70 - 130 mg/dL Final       CMP  Lab Results   Component Value Date    GLUCOSE 104 (H) 04/04/2023    BUN 21 (H) 04/04/2023    CREATININE 0.86 04/04/2023    BCR 24.4 04/04/2023    K 4.2 04/04/2023    CO2 27.0 04/04/2023    CALCIUM 10.1 04/04/2023    AST 9 04/04/2023    ALT 27 04/04/2023       LIPID PANEL  Lab Results   Component Value Date    CHOL 105 04/04/2023    TRIG 58 04/04/2023    HDL 61 (H) 04/04/2023    LDL 31 04/04/2023       URINE MICROALBUMIN/CREATININE RATIO  Microalbumin/Creatinine Ratio   Date Value Ref Range Status   04/04/2023   Final     Comment:     Unable to calculate       TSH  Lab Results   Component Value Date    TSH 1.890 10/04/2023       Assessment / Plan      Assessment & Plan:  1. Prediabetes  His hemoglobin A1c is up slightly as compared to October but still normal at 5.5%.  For now we will not add any medication.  He will continue healthy eating habits and physical activity.  We will continue to monitor this in the future.  Will plan to do fasting labs in the next few weeks at his convenience for monitoring.  Will send note with results and plan.  - POC Glycosylated Hemoglobin (Hb A1C)  - POC Glucose, Blood  - Comprehensive Metabolic Panel; Future  - Lipid Panel; Future  - Microalbumin / Creatinine Urine Ratio - Urine, Clean Catch; Future    2. Thyroid nodule  Thyroid ultrasound was performed today by Dr. Tommy Emanuel.  This showed several adjacent solid nodules in the inferior right lobe with the largest measuring 1.75 x 1.18 x 1.56 cm.  A tiny cystic solid nodule noted in the left lobe.  There were no concerning lymph nodes.  The nodule on the right is  slightly larger than last year.  Dr. Emanuel reviewed the ultrasound with the patient and discussed the recommendation for FNA.  He reviewed the possible side effects as well as the procedure.  Patient was agreeable to schedule this sometime in the next several weeks.  TSH pending for upcoming labs.  - TSH; Future  - US Thyroid; Future    3. Benign hypertension  Patient's blood pressure is excellent today.  He will continue his losartan.      Return in about 6 months (around 10/15/2024) for Recheck, sees Dr. Tommy Emanuel please schedule with him.  .     This note was dictated using Dragon voice recognition.    Electronically signed by: SUNG Claros  04/15/2024

## 2024-05-30 ENCOUNTER — LAB (OUTPATIENT)
Dept: ENDOCRINOLOGY | Facility: CLINIC | Age: 39
End: 2024-05-30
Payer: COMMERCIAL

## 2024-05-30 DIAGNOSIS — E04.1 THYROID NODULE: ICD-10-CM

## 2024-05-30 DIAGNOSIS — R73.03 PREDIABETES: ICD-10-CM

## 2024-05-30 LAB
ALBUMIN UR-MCNC: 1.4 MG/DL
CREAT UR-MCNC: 231.8 MG/DL
MICROALBUMIN/CREAT UR: 6 MG/G (ref 0–29)

## 2024-05-30 PROCEDURE — 80053 COMPREHEN METABOLIC PANEL: CPT | Performed by: PHYSICIAN ASSISTANT

## 2024-05-30 PROCEDURE — 82043 UR ALBUMIN QUANTITATIVE: CPT | Performed by: PHYSICIAN ASSISTANT

## 2024-05-30 PROCEDURE — 36415 COLL VENOUS BLD VENIPUNCTURE: CPT | Performed by: PHYSICIAN ASSISTANT

## 2024-05-30 PROCEDURE — 84443 ASSAY THYROID STIM HORMONE: CPT | Performed by: PHYSICIAN ASSISTANT

## 2024-05-30 PROCEDURE — 82570 ASSAY OF URINE CREATININE: CPT | Performed by: PHYSICIAN ASSISTANT

## 2024-05-30 PROCEDURE — 80061 LIPID PANEL: CPT | Performed by: PHYSICIAN ASSISTANT

## 2024-05-31 LAB
ALBUMIN SERPL-MCNC: 4.5 G/DL (ref 3.5–5.2)
ALBUMIN/GLOB SERPL: 1.9 G/DL
ALP SERPL-CCNC: 73 U/L (ref 39–117)
ALT SERPL W P-5'-P-CCNC: 23 U/L (ref 1–41)
ANION GAP SERPL CALCULATED.3IONS-SCNC: 10.4 MMOL/L (ref 5–15)
AST SERPL-CCNC: 11 U/L (ref 1–40)
BILIRUB SERPL-MCNC: 0.3 MG/DL (ref 0–1.2)
BUN SERPL-MCNC: 18 MG/DL (ref 6–20)
BUN/CREAT SERPL: 20.5 (ref 7–25)
CALCIUM SPEC-SCNC: 9.3 MG/DL (ref 8.6–10.5)
CHLORIDE SERPL-SCNC: 102 MMOL/L (ref 98–107)
CHOLEST SERPL-MCNC: 118 MG/DL (ref 0–200)
CO2 SERPL-SCNC: 24.6 MMOL/L (ref 22–29)
CREAT SERPL-MCNC: 0.88 MG/DL (ref 0.76–1.27)
EGFRCR SERPLBLD CKD-EPI 2021: 114.3 ML/MIN/1.73
GLOBULIN UR ELPH-MCNC: 2.4 GM/DL
GLUCOSE SERPL-MCNC: 113 MG/DL (ref 65–99)
HDLC SERPL-MCNC: 63 MG/DL (ref 40–60)
LDLC SERPL CALC-MCNC: 42 MG/DL (ref 0–100)
LDLC/HDLC SERPL: 0.68 {RATIO}
POTASSIUM SERPL-SCNC: 4.2 MMOL/L (ref 3.5–5.2)
PROT SERPL-MCNC: 6.9 G/DL (ref 6–8.5)
SODIUM SERPL-SCNC: 137 MMOL/L (ref 136–145)
TRIGL SERPL-MCNC: 61 MG/DL (ref 0–150)
TSH SERPL DL<=0.05 MIU/L-ACNC: 1.24 UIU/ML (ref 0.27–4.2)
VLDLC SERPL-MCNC: 13 MG/DL (ref 5–40)

## 2024-06-03 ENCOUNTER — OFFICE VISIT (OUTPATIENT)
Dept: ENDOCRINOLOGY | Facility: CLINIC | Age: 39
End: 2024-06-03
Payer: COMMERCIAL

## 2024-06-03 VITALS
BODY MASS INDEX: 26.95 KG/M2 | HEIGHT: 72 IN | SYSTOLIC BLOOD PRESSURE: 112 MMHG | WEIGHT: 199 LBS | HEART RATE: 70 BPM | OXYGEN SATURATION: 99 % | DIASTOLIC BLOOD PRESSURE: 70 MMHG

## 2024-06-03 DIAGNOSIS — R73.03 PREDIABETES: ICD-10-CM

## 2024-06-03 DIAGNOSIS — E04.1 THYROID NODULE: Primary | ICD-10-CM

## 2024-06-03 LAB
EXPIRATION DATE: ABNORMAL
GLUCOSE BLDC GLUCOMTR-MCNC: 143 MG/DL (ref 70–130)
Lab: ABNORMAL

## 2024-06-03 PROCEDURE — 10005 FNA BX W/US GDN 1ST LES: CPT | Performed by: INTERNAL MEDICINE

## 2024-06-03 PROCEDURE — 82947 ASSAY GLUCOSE BLOOD QUANT: CPT | Performed by: INTERNAL MEDICINE

## 2024-06-03 NOTE — ASSESSMENT & PLAN NOTE
We discussed procedure for FNA of right thyroid nodule.  He agreed to this.    Informed consent was obtained.  The neck was prepped with alcohol.  The u/s was used for guidance.  Four passes were made into the 1.75cm right isoechoic nodule.  He tolerated the procedure well without obvious complication.    Will contact him with results when available.

## 2024-06-03 NOTE — PROGRESS NOTES
"     Office Note      Date: 2024  Patient Name: Montrell Raymundo  MRN: [id number]  : 1985    Chief Complaint   Patient presents with    Thyroid Problem       History of Present Illness:   Montrell Raymundo is a 38 y.o. male who presents for Thyroid Problem    He returns for FNA of 1.75 right thyroid nodule which is isoechoic.    Subjective      Review of Systems:   Review of Systems    The following portions of the patient's history were reviewed and updated as appropriate: allergies, current medications, past family history, past medical history, past social history, past surgical history, and problem list.    Objective     Visit Vitals  /70 (BP Location: Right arm, Patient Position: Sitting, Cuff Size: Adult)   Pulse 70   Ht 182.9 cm (72\")   Wt 90.3 kg (199 lb)   SpO2 99%   BMI 26.99 kg/m²       Physical Exam:  Physical Exam    Labs:    TSH  No results found for: \"TSHBASE\"     Free T4  No results found for: \"FREET4\"    T3  No results found for: \"A1WHJBY\"      TPO  No results found for: \"THYROIDAB\"    TG AB  No results found for: \"THGAB\"    TG  No results found for: \"THYROGLB\"    CBC w/DIFF  Lab Results   Component Value Date    WBC 5.94 2023    RBC 4.76 2023    HGB 14.2 2023    HCT 41.8 2023    MCV 87.8 2023    MCH 29.8 2023    MCHC 34.0 2023    RDW 12.1 (L) 2023    RDWSD 38.6 2023    MPV 11.1 2023     2023           Assessment / Plan      Assessment & Plan:  Diagnoses and all orders for this visit:    1. Thyroid nodule (Primary)  Assessment & Plan:  We discussed procedure for FNA of right thyroid nodule.  He agreed to this.    Informed consent was obtained.  The neck was prepped with alcohol.  The u/s was used for guidance.  Four passes were made into the 1.75cm right isoechoic nodule.  He tolerated the procedure well without obvious complication.    Will contact him with results when available.    Orders:  -     US Fine Needle " Aspiration BX 1st Lesion; Future  -     NON-GYN CYTOLOGY, [facility] ([facility] only)    2. Prediabetes  Assessment & Plan:  Non fasting glucose okay today.    Orders:  -     POC Glucose, Blood      Current Outpatient Medications   Medication Instructions    Blood Glucose Monitoring Suppl (OneTouch Verio Reflect) w/Device kit See Admin Instructions    Lancets (OneTouch Delica Plus [id number]) misc 1 each, Does not apply, 2 Times Daily, ICD-10 E11.9    losartan (COZAAR) 25 mg, Oral, Daily    OneTouch Verio test strip 1 each, Other, 2 Times Daily, ICD-10 E11.65    rosuvastatin (CRESTOR) 20 mg, Oral, Nightly      Return in about 6 months (around 12/3/2024) for Recheck with A1c, TSH.    Electronically signed by: Tommy Emanuel MD  06/03/2024

## 2024-06-05 LAB — REF LAB TEST METHOD: NORMAL

## 2024-06-17 ENCOUNTER — PATIENT MESSAGE (OUTPATIENT)
Dept: ENDOCRINOLOGY | Facility: CLINIC | Age: 39
End: 2024-06-17
Payer: COMMERCIAL

## 2024-06-17 RX ORDER — ROSUVASTATIN CALCIUM 20 MG/1
20 TABLET, COATED ORAL EVERY EVENING
Qty: 90 TABLET | Refills: 1 | OUTPATIENT
Start: 2024-06-17

## 2024-06-17 NOTE — TELEPHONE ENCOUNTER
Cholesterol looks good on current dose. He is young, but I don't know his other risk factors for cardiovascular disease so I will defer the decision to keep or stop Crestor to Estefany.

## 2024-06-17 NOTE — TELEPHONE ENCOUNTER
From: Montrell Raymundo  To: Estefany David  Sent: 6/17/2024 9:52 AM EDT  Subject: Prescription     Hi Estefany,    I was just in for my appointment where we discussed my prescription for Crestor. I had asked if this was something I needed to be on and we decided to wait until I had my blood work done. I have since completed my blood work. Could you review everything to see if this is something I should continue to take? I believe the pharmacy was trying to get this prescription filled, but I see it was denied as I need an appointment. Is this needed since I was just recently in and will be in next Monday as well?     Thank you,    Montrell Raymundo

## 2024-06-20 RX ORDER — ROSUVASTATIN CALCIUM 20 MG/1
20 TABLET, COATED ORAL NIGHTLY
Qty: 30 TABLET | Refills: 0 | Status: SHIPPED | OUTPATIENT
Start: 2024-06-20 | End: 2024-06-24 | Stop reason: SDUPTHER

## 2024-06-24 ENCOUNTER — OFFICE VISIT (OUTPATIENT)
Dept: ENDOCRINOLOGY | Facility: CLINIC | Age: 39
End: 2024-06-24
Payer: COMMERCIAL

## 2024-06-24 VITALS
HEART RATE: 73 BPM | WEIGHT: 200 LBS | OXYGEN SATURATION: 96 % | HEIGHT: 72 IN | BODY MASS INDEX: 27.09 KG/M2 | SYSTOLIC BLOOD PRESSURE: 120 MMHG | DIASTOLIC BLOOD PRESSURE: 74 MMHG

## 2024-06-24 DIAGNOSIS — E04.1 THYROID NODULE: Primary | ICD-10-CM

## 2024-06-24 PROCEDURE — 10005 FNA BX W/US GDN 1ST LES: CPT | Performed by: INTERNAL MEDICINE

## 2024-06-24 RX ORDER — ROSUVASTATIN CALCIUM 20 MG/1
20 TABLET, COATED ORAL NIGHTLY
Qty: 90 TABLET | Refills: 3 | Status: SHIPPED | OUTPATIENT
Start: 2024-06-24

## 2024-06-24 NOTE — ASSESSMENT & PLAN NOTE
Informed consent was obtained.  The neck was prepped with alcohol.  The u/s was used for guidance.  Four passes were made into the 1.75cm right isoechoic nodule.  He tolerated the procedure well without obvious complication.     Will contact him with results when available.

## 2024-06-24 NOTE — PROGRESS NOTES
"     Office Note      Date: 2024  Patient Name: Montrell Raymundo  MRN: [id number]  : 1985    Chief Complaint   Patient presents with    Thyroid Problem     Thyroid nodule       History of Present Illness:   Montrell Raymundo is a 38 y.o. male who presents for Thyroid Problem (Thyroid nodule)    He returns for FNA of 1.75cm right thyroid nodule.  Prior FNA was inadequate sample.    Subjective      Review of Systems:   Review of Systems    The following portions of the patient's history were reviewed and updated as appropriate: allergies, current medications, past family history, past medical history, past social history, past surgical history, and problem list.    Objective     Visit Vitals  /74 (BP Location: Right arm, Patient Position: Sitting, Cuff Size: Adult)   Pulse 73   Ht 182.9 cm (72\")   Wt 90.7 kg (200 lb)   SpO2 96%   BMI 27.12 kg/m²       Physical Exam:  Physical Exam    Labs:    TSH  No results found for: \"TSHBASE\"     Free T4  No results found for: \"FREET4\"    T3  No results found for: \"K1FWEXV\"      TPO  No results found for: \"THYROIDAB\"    TG AB  No results found for: \"THGAB\"    TG  No results found for: \"THYROGLB\"    CBC w/DIFF  Lab Results   Component Value Date    WBC 5.94 2023    RBC 4.76 2023    HGB 14.2 2023    HCT 41.8 2023    MCV 87.8 2023    MCH 29.8 2023    MCHC 34.0 2023    RDW 12.1 (L) 2023    RDWSD 38.6 2023    MPV 11.1 2023     2023           Assessment / Plan      Assessment & Plan:  Diagnoses and all orders for this visit:    1. Thyroid nodule (Primary)  Assessment & Plan:  Informed consent was obtained.  The neck was prepped with alcohol.  The u/s was used for guidance.  Four passes were made into the 1.75cm right isoechoic nodule.  He tolerated the procedure well without obvious complication.     Will contact him with results when available.    Orders:  -     US Fine Needle Aspiration BX 1st Lesion; " Future    Other orders  -     rosuvastatin (CRESTOR) 20 MG tablet; Take 1 tablet by mouth Every Night.  Dispense: 90 tablet; Refill: 3      Current Outpatient Medications   Medication Instructions    Blood Glucose Monitoring Suppl (OneTouch Verio Reflect) w/Device kit See Admin Instructions    Lancets (OneTouch Delica Plus [id number]) misc 1 each, Does not apply, 2 Times Daily, ICD-10 E11.9    losartan (COZAAR) 25 mg, Oral, Daily    OneTouch Verio test strip 1 each, Other, 2 Times Daily, ICD-10 E11.65    rosuvastatin (CRESTOR) 20 mg, Oral, Nightly      Return for Recheck with A1c, TSH, Next scheduled follow up.    Electronically signed by: Tommy Emanuel MD  06/24/2024

## 2024-06-25 LAB — REF LAB TEST METHOD: NORMAL

## 2024-11-05 ENCOUNTER — OFFICE VISIT (OUTPATIENT)
Dept: ENDOCRINOLOGY | Facility: CLINIC | Age: 39
End: 2024-11-05
Payer: COMMERCIAL

## 2024-11-05 VITALS
BODY MASS INDEX: 26.82 KG/M2 | OXYGEN SATURATION: 98 % | SYSTOLIC BLOOD PRESSURE: 114 MMHG | DIASTOLIC BLOOD PRESSURE: 69 MMHG | WEIGHT: 198 LBS | HEIGHT: 72 IN | HEART RATE: 60 BPM

## 2024-11-05 DIAGNOSIS — I10 BENIGN HYPERTENSION: ICD-10-CM

## 2024-11-05 DIAGNOSIS — R73.03 PREDIABETES: Primary | ICD-10-CM

## 2024-11-05 DIAGNOSIS — E04.1 THYROID NODULE: ICD-10-CM

## 2024-11-05 LAB
EXPIRATION DATE: ABNORMAL
EXPIRATION DATE: ABNORMAL
GLUCOSE BLDC GLUCOMTR-MCNC: 131 MG/DL (ref 70–130)
HBA1C MFR BLD: 5.9 % (ref 4.5–5.7)
Lab: ABNORMAL
Lab: ABNORMAL
TSH SERPL DL<=0.05 MIU/L-ACNC: 1.75 UIU/ML (ref 0.27–4.2)

## 2024-11-05 PROCEDURE — 84443 ASSAY THYROID STIM HORMONE: CPT | Performed by: INTERNAL MEDICINE

## 2024-11-05 RX ORDER — FLUOXETINE 10 MG/1
1 CAPSULE ORAL DAILY
COMMUNITY
Start: 2024-10-08

## 2024-11-05 NOTE — LETTER
2024     DEIRDRE Hoff  [address]  [address]  [facility] [address]    Patient: Montrell Raymundo   YOB: 1985   Date of Visit: 2024     Dear DEIRDRE Hoff:       Thank you for referring Montrell Raymundo to me for evaluation. Below are the relevant portions of my assessment and plan of care.    If you have questions, please do not hesitate to call me. I look forward to following Montrell along with you.         Sincerely,        Tommy Emanuel MD        CC: No Recipients    Tommy Emanuel MD  24 [phone]  Sign when Signing Visit       Office Note      Date: 2024  Patient Name: Montrell Raymundo  MRN: [id number]  : 1985    Chief Complaint   Patient presents with   • Thyroid Problem   • Prediabetes       History of Present Illness:   Montrell Raymundo is a 38 y.o. male who presents for Prediabetes. Diagnosed in: . Treated in past with oral agents. Current treatments: diet. Number of insulin shots per day: none. Checks blood sugar 1 time a week. Has low blood sugar: no. Aspirin use: No. Statin use: Yes. ACE-I/ARB use: Yes.  Change in health since last visit: COVID-19 infection last month - higher glucose levels around that time.  Last eye exam: 2022.     He has h/o 1.75cm right thyroid nodule.  FNA in 2024 was benign.  He hasn't noted any change in the size of his neck.  He denies any compressive sxs.  He denies any sxs of hypo- or hyperthyroidism at this time.     Subjective      Diabetic Complications:  Eyes: No  Kidneys: No  Feet: No  Heart: No    Diet and Exercise:  Meals per day: 3  Minutes of exercise per week: 210 mins.    Review of Systems:   Review of Systems   Constitutional: Negative.    Cardiovascular: Negative.    Gastrointestinal: Negative.    Endocrine: Negative.        The following portions of the patient's history were reviewed and updated as appropriate: allergies, current medications, past family history, past medical  "history, past social history, past surgical history, and problem list.    Objective     Visit Vitals  /69   Pulse 60   Ht 182.9 cm (72\")   Wt 89.8 kg (198 lb)   SpO2 98%   BMI 26.85 kg/m²       Physical Exam:  Physical Exam  Constitutional:       Appearance: Normal appearance.   Neck:      Thyroid: Thyroid mass present. No thyromegaly or thyroid tenderness.      Comments: ~1.5cm nodule in right thyroid lobe - freely movable  Lymphadenopathy:      Cervical: No cervical adenopathy.   Neurological:      Mental Status: He is alert.         Labs:    HbA1c  Lab Results   Component Value Date    HGBA1C 5.9 (A) 11/05/2024       CMP  Lab Results   Component Value Date    GLUCOSE 113 (H) 05/30/2024    BUN 18 05/30/2024    CREATININE 0.88 05/30/2024    BCR 20.5 05/30/2024    K 4.2 05/30/2024    CO2 24.6 05/30/2024    CALCIUM 9.3 05/30/2024    AST 11 05/30/2024    ALT 23 05/30/2024        Lipid Panel  Lab Results   Component Value Date    HDL Cholesterol 63 (H) 05/30/2024    LDL Cholesterol  42 05/30/2024    LDL/HDL Ratio 0.68 05/30/2024    Triglycerides 61 05/30/2024        TSH  Lab Results   Component Value Date    TSH 1.240 05/30/2024        Hemoglobin A1C  No components found for: \"HGBA1C\"     Microalbumin/Creatinine  Lab Results   Component Value Date    MALBCRERATIO 6.0 05/30/2024    MICROALBUR 1.4 05/30/2024           Assessment / Plan      Assessment & Plan:  Diagnoses and all orders for this visit:    1. Prediabetes (Primary)  Assessment & Plan:  A1c has increased but okay at 5.9%.  Work on diet/exercise.    Orders:  -     POC Glucose, Blood  -     POC Glycosylated Hemoglobin (Hb A1C)    2. Thyroid nodule  Assessment & Plan:  Stable to palpation.  Check TSH.  Plan for neck u/s in about 6 months.    Orders:  -     TSH; Future    3. Benign hypertension  Assessment & Plan:  Hypertension is stable and controlled  Continue current treatment regimen.  Blood pressure will be reassessed in 6 months.        Current " Outpatient Medications   Medication Instructions   • Blood Glucose Monitoring Suppl (OneTouch Verio Reflect) w/Device kit See Admin Instructions   • Diclofenac Sodium (VOLTAREN) 1 % gel gel APPLY 2 GRAMS TOPICALLY TO THE AFFECTED AREA FOUR TIMES DAILY   • FLUoxetine (PROzac) 10 MG capsule 1 capsule, Daily   • Lancets (OneTouch Delica Plus [id number]) misc 1 each, Does not apply, 2 Times Daily, ICD-10 E11.9   • losartan (COZAAR) 25 mg, Daily   • OneTouch Verio test strip 1 each, Other, 2 Times Daily, ICD-10 E11.65   • rosuvastatin (CRESTOR) 20 mg, Oral, Nightly      Return in about 6 months (around 5/5/2025) for Recheck with A1c, CMP, lipid, TSH, microalbumin, foot exam, neck u/s.    Electronically signed by: Tommy Emanuel MD  11/05/2024

## 2024-11-05 NOTE — PROGRESS NOTES
"     Office Note      Date: 2024  Patient Name: Montrell Raymundo  MRN: [id number]  : 1985    Chief Complaint   Patient presents with    Thyroid Problem    Prediabetes       History of Present Illness:   Montrell Raymundo is a 38 y.o. male who presents for Prediabetes. Diagnosed in: . Treated in past with oral agents. Current treatments: diet. Number of insulin shots per day: none. Checks blood sugar 1 time a week. Has low blood sugar: no. Aspirin use: No. Statin use: Yes. ACE-I/ARB use: Yes.  Change in health since last visit: COVID-19 infection last month - higher glucose levels around that time.  Last eye exam: 2022.     He has h/o 1.75cm right thyroid nodule.  FNA in 2024 was benign.  He hasn't noted any change in the size of his neck.  He denies any compressive sxs.  He denies any sxs of hypo- or hyperthyroidism at this time.     Subjective      Diabetic Complications:  Eyes: No  Kidneys: No  Feet: No  Heart: No    Diet and Exercise:  Meals per day: 3  Minutes of exercise per week: 210 mins.    Review of Systems:   Review of Systems   Constitutional: Negative.    Cardiovascular: Negative.    Gastrointestinal: Negative.    Endocrine: Negative.        The following portions of the patient's history were reviewed and updated as appropriate: allergies, current medications, past family history, past medical history, past social history, past surgical history, and problem list.    Objective     Visit Vitals  /69   Pulse 60   Ht 182.9 cm (72\")   Wt 89.8 kg (198 lb)   SpO2 98%   BMI 26.85 kg/m²       Physical Exam:  Physical Exam  Constitutional:       Appearance: Normal appearance.   Neck:      Thyroid: Thyroid mass present. No thyromegaly or thyroid tenderness.      Comments: ~1.5cm nodule in right thyroid lobe - freely movable  Lymphadenopathy:      Cervical: No cervical adenopathy.   Neurological:      Mental Status: He is alert.         Labs:    HbA1c  Lab Results   Component Value Date    HGBA1C " "5.9 (A) 11/05/2024       CMP  Lab Results   Component Value Date    GLUCOSE 113 (H) 05/30/2024    BUN 18 05/30/2024    CREATININE 0.88 05/30/2024    BCR 20.5 05/30/2024    K 4.2 05/30/2024    CO2 24.6 05/30/2024    CALCIUM 9.3 05/30/2024    AST 11 05/30/2024    ALT 23 05/30/2024        Lipid Panel  Lab Results   Component Value Date    HDL Cholesterol 63 (H) 05/30/2024    LDL Cholesterol  42 05/30/2024    LDL/HDL Ratio 0.68 05/30/2024    Triglycerides 61 05/30/2024        TSH  Lab Results   Component Value Date    TSH 1.240 05/30/2024        Hemoglobin A1C  No components found for: \"HGBA1C\"     Microalbumin/Creatinine  Lab Results   Component Value Date    MALBCRERATIO 6.0 05/30/2024    MICROALBUR 1.4 05/30/2024           Assessment / Plan      Assessment & Plan:  Diagnoses and all orders for this visit:    1. Prediabetes (Primary)  Assessment & Plan:  A1c has increased but okay at 5.9%.  Work on diet/exercise.    Orders:  -     POC Glucose, Blood  -     POC Glycosylated Hemoglobin (Hb A1C)    2. Thyroid nodule  Assessment & Plan:  Stable to palpation.  Check TSH.  Plan for neck u/s in about 6 months.    Orders:  -     TSH; Future    3. Benign hypertension  Assessment & Plan:  Hypertension is stable and controlled  Continue current treatment regimen.  Blood pressure will be reassessed in 6 months.        Current Outpatient Medications   Medication Instructions    Blood Glucose Monitoring Suppl (OneTouch Verio Reflect) w/Device kit See Admin Instructions    Diclofenac Sodium (VOLTAREN) 1 % gel gel APPLY 2 GRAMS TOPICALLY TO THE AFFECTED AREA FOUR TIMES DAILY    FLUoxetine (PROzac) 10 MG capsule 1 capsule, Daily    Lancets (OneTouch Delica Plus [id number]) misc 1 each, Does not apply, 2 Times Daily, ICD-10 E11.9    losartan (COZAAR) 25 mg, Daily    OneTouch Verio test strip 1 each, Other, 2 Times Daily, ICD-10 E11.65    rosuvastatin (CRESTOR) 20 mg, Oral, Nightly      Return in about 6 months (around 5/5/2025) for " Recheck with A1c, CMP, lipid, TSH, microalbumin, foot exam, neck u/s.    Electronically signed by: Tommy Emanuel MD  11/05/2024

## 2025-05-27 ENCOUNTER — OFFICE VISIT (OUTPATIENT)
Dept: ENDOCRINOLOGY | Facility: CLINIC | Age: 40
End: 2025-05-27
Payer: COMMERCIAL

## 2025-05-27 ENCOUNTER — RESULTS FOLLOW-UP (OUTPATIENT)
Dept: ENDOCRINOLOGY | Facility: CLINIC | Age: 40
End: 2025-05-27

## 2025-05-27 VITALS
BODY MASS INDEX: 26.68 KG/M2 | HEART RATE: 76 BPM | OXYGEN SATURATION: 97 % | SYSTOLIC BLOOD PRESSURE: 114 MMHG | HEIGHT: 72 IN | DIASTOLIC BLOOD PRESSURE: 74 MMHG | WEIGHT: 197 LBS

## 2025-05-27 DIAGNOSIS — R73.03 PREDIABETES: Primary | ICD-10-CM

## 2025-05-27 DIAGNOSIS — E04.1 THYROID NODULE: ICD-10-CM

## 2025-05-27 DIAGNOSIS — I10 BENIGN HYPERTENSION: ICD-10-CM

## 2025-05-27 LAB
ALBUMIN SERPL-MCNC: 4.5 G/DL (ref 3.5–5.2)
ALBUMIN UR-MCNC: <1.2 MG/DL
ALBUMIN/GLOB SERPL: 1.7 G/DL
ALP SERPL-CCNC: 67 U/L (ref 39–117)
ALT SERPL W P-5'-P-CCNC: 20 U/L (ref 1–41)
ANION GAP SERPL CALCULATED.3IONS-SCNC: 12.5 MMOL/L (ref 5–15)
AST SERPL-CCNC: 12 U/L (ref 1–40)
BILIRUB SERPL-MCNC: 0.4 MG/DL (ref 0–1.2)
BUN SERPL-MCNC: 19 MG/DL (ref 6–20)
BUN/CREAT SERPL: 20.9 (ref 7–25)
CALCIUM SPEC-SCNC: 9.1 MG/DL (ref 8.6–10.5)
CHLORIDE SERPL-SCNC: 102 MMOL/L (ref 98–107)
CHOLEST SERPL-MCNC: 161 MG/DL (ref 0–200)
CO2 SERPL-SCNC: 23.5 MMOL/L (ref 22–29)
CREAT SERPL-MCNC: 0.91 MG/DL (ref 0.76–1.27)
CREAT UR-MCNC: 108 MG/DL
EGFRCR SERPLBLD CKD-EPI 2021: 111.3 ML/MIN/1.73
EXPIRATION DATE: ABNORMAL
EXPIRATION DATE: NORMAL
GLOBULIN UR ELPH-MCNC: 2.7 GM/DL
GLUCOSE BLDC GLUCOMTR-MCNC: 116 MG/DL (ref 70–130)
GLUCOSE SERPL-MCNC: 104 MG/DL (ref 65–99)
HBA1C MFR BLD: 5.9 % (ref 4.5–5.7)
HDLC SERPL-MCNC: 57 MG/DL (ref 40–60)
LDLC SERPL CALC-MCNC: 88 MG/DL (ref 0–100)
LDLC/HDLC SERPL: 1.53 {RATIO}
Lab: ABNORMAL
Lab: NORMAL
MICROALBUMIN/CREAT UR: NORMAL MG/G{CREAT}
POTASSIUM SERPL-SCNC: 4 MMOL/L (ref 3.5–5.2)
PROT SERPL-MCNC: 7.2 G/DL (ref 6–8.5)
SODIUM SERPL-SCNC: 138 MMOL/L (ref 136–145)
TRIGL SERPL-MCNC: 83 MG/DL (ref 0–150)
TSH SERPL DL<=0.05 MIU/L-ACNC: 1.32 UIU/ML (ref 0.27–4.2)
VLDLC SERPL-MCNC: 16 MG/DL (ref 5–40)

## 2025-05-27 PROCEDURE — 82947 ASSAY GLUCOSE BLOOD QUANT: CPT | Performed by: INTERNAL MEDICINE

## 2025-05-27 PROCEDURE — 82043 UR ALBUMIN QUANTITATIVE: CPT | Performed by: INTERNAL MEDICINE

## 2025-05-27 PROCEDURE — 99214 OFFICE O/P EST MOD 30 MIN: CPT | Performed by: INTERNAL MEDICINE

## 2025-05-27 PROCEDURE — 83036 HEMOGLOBIN GLYCOSYLATED A1C: CPT | Performed by: INTERNAL MEDICINE

## 2025-05-27 PROCEDURE — 80061 LIPID PANEL: CPT | Performed by: INTERNAL MEDICINE

## 2025-05-27 PROCEDURE — 76536 US EXAM OF HEAD AND NECK: CPT | Performed by: INTERNAL MEDICINE

## 2025-05-27 PROCEDURE — 80053 COMPREHEN METABOLIC PANEL: CPT | Performed by: INTERNAL MEDICINE

## 2025-05-27 PROCEDURE — 84443 ASSAY THYROID STIM HORMONE: CPT | Performed by: INTERNAL MEDICINE

## 2025-05-27 PROCEDURE — 82570 ASSAY OF URINE CREATININE: CPT | Performed by: INTERNAL MEDICINE

## 2025-05-27 NOTE — ASSESSMENT & PLAN NOTE
Check TSH today.    Benign FNA 6/2024.    A neck u/s was performed today.  This revealed several adjacent nodules in the right thyroid lobe.  The largest measured 1.96cm.  This may represent a slight increase in size compared to u/s from 4/2024.  No abnormal lymph nodes were seen.    Plan for another u/s in 1 year.    We discussed option of surgery if the nodule continues to enlarge or causes compressive symptoms.

## 2025-05-27 NOTE — PROGRESS NOTES
"     Office Note      Date: 2025  Patient Name: Montrell Raymundo  MRN: [id number]  : 1985    Chief Complaint   Patient presents with    Thyroid Problem    Prediabetes       History of Present Illness:   Montrell Raymundo is a 39 y.o. male who presents for Prediabetes. Diagnosed in: . Treated in past with oral agents. Current treatments: diet. Number of insulin shots per day: none. Checks blood sugar 1 time a week. Has low blood sugar: no. Aspirin use: No. Statin use: No. ACE-I/ARB use: Yes.  Change in health since last visit: none.  Last eye exam: 2022.      He has h/o 1.75cm right thyroid nodule.  FNA in 2024 was benign.  He hasn't noted any change in the size of his neck. He notes some pressure in his neck with neck flexion.  He denies any sxs of hypo- or hyperthyroidism at this time.    Subjective      Diabetic Complications:  Eyes: No  Kidneys: No  Feet: No  Heart: No    Diet and Exercise:  Meals per day: 3  Minutes of exercise per week: 210 mins.    Review of Systems:   Review of Systems   Constitutional: Negative.    Cardiovascular: Negative.    Gastrointestinal: Negative.    Endocrine: Negative.        The following portions of the patient's history were reviewed and updated as appropriate: allergies, current medications, past family history, past medical history, past social history, past surgical history, and problem list.    Objective     Visit Vitals  /74   Pulse 76   Ht 182.9 cm (72\")   Wt 89.4 kg (197 lb)   SpO2 97%   BMI 26.72 kg/m²       Physical Exam:  Physical Exam  Constitutional:       Appearance: Normal appearance.   Neck:      Thyroid: Thyroid mass present. No thyromegaly or thyroid tenderness.      Comments: ~1.5-2cm nodule in right lower thyroid lobe - freely movable  Cardiovascular:      Pulses:           Dorsalis pedis pulses are 2+ on the right side and 2+ on the left side.        Posterior tibial pulses are 2+ on the right side and 2+ on the left side.   Feet:      Right " "foot:      Protective Sensation: 5 sites tested.  5 sites sensed.      Skin integrity: Skin integrity normal.      Toenail Condition: Right toenails are normal.      Left foot:      Protective Sensation: 5 sites tested.  5 sites sensed.      Skin integrity: Skin integrity normal.      Toenail Condition: Left toenails are normal.   Lymphadenopathy:      Cervical: No cervical adenopathy.   Neurological:      Mental Status: He is alert.         Labs:    HbA1c  Lab Results   Component Value Date    HGBA1C 5.9 (A) 05/27/2025       CMP  Lab Results   Component Value Date    GLUCOSE 192 (H) 12/03/2024    BUN 18 05/30/2024    CREATININE 0.88 05/30/2024    BCR 20.5 05/30/2024    K 3.4 (L) 12/03/2024    CO2 24.6 05/30/2024    CALCIUM 9.3 05/30/2024    AST 11 05/30/2024    ALT 23 05/30/2024        Lipid Panel  Lab Results   Component Value Date    HDL Cholesterol 63 (H) 05/30/2024    LDL Cholesterol  42 05/30/2024    LDL/HDL Ratio 0.68 05/30/2024    Triglycerides 61 05/30/2024        TSH  Lab Results   Component Value Date    TSH 1.750 11/05/2024    FREET4 1.5 12/03/2024        Hemoglobin A1C  No components found for: \"HGBA1C\"     Microalbumin/Creatinine  Lab Results   Component Value Date    MALBCRERATIO 6.0 05/30/2024    MICROALBUR 1.4 05/30/2024           Assessment / Plan      Assessment & Plan:  Diagnoses and all orders for this visit:    1. Prediabetes (Primary)  Assessment & Plan:  A1c is stable.  Continue to work on diet/exercise.  His PCP stopped the statin.  Will check lipids today.    Orders:  -     POC Glucose, Blood  -     POC Glycosylated Hemoglobin (Hb A1C)  -     Comprehensive Metabolic Panel; Future  -     Lipid Panel; Future  -     Microalbumin / Creatinine Urine Ratio - Urine, Clean Catch; Future    2. Thyroid nodule  Assessment & Plan:  Check TSH today.    Benign FNA 6/2024.    A neck u/s was performed today.  This revealed several adjacent nodules in the right thyroid lobe.  The largest measured 1.96cm.  " This may represent a slight increase in size compared to u/s from 4/2024.  No abnormal lymph nodes were seen.    Plan for another u/s in 1 year.    We discussed option of surgery if the nodule continues to enlarge or causes compressive symptoms.    Orders:  -     TSH; Future  -     US Thyroid    3. Benign hypertension  Assessment & Plan:  Hypertension is stable and controlled.  Continue current treatment regimen.  Blood pressure will be reassessed in 6 months.        Current Outpatient Medications   Medication Instructions    Blood Glucose Monitoring Suppl (OneTouch Verio Reflect) w/Device kit See Admin Instructions    Diclofenac Sodium (VOLTAREN) 1 % gel gel APPLY 2 GRAMS TOPICALLY TO THE AFFECTED AREA FOUR TIMES DAILY    FLUoxetine (PROzac) 10 MG capsule 1 capsule, Daily    Lancets (OneTouch Delica Plus [id number]) misc 1 each, Not Applicable, 2 Times Daily, ICD-10 E11.9    losartan (COZAAR) 25 mg, Daily    OneTouch Verio test strip 1 each, Other, 2 Times Daily, ICD-10 E11.65      Return in about 6 months (around 11/27/2025) for Recheck with A1c, TSH.    Electronically signed by: Tommy Emanuel MD  05/27/2025

## 2025-05-27 NOTE — ASSESSMENT & PLAN NOTE
A1c is stable.  Continue to work on diet/exercise.  His PCP stopped the statin.  Will check lipids today.